# Patient Record
Sex: MALE
[De-identification: names, ages, dates, MRNs, and addresses within clinical notes are randomized per-mention and may not be internally consistent; named-entity substitution may affect disease eponyms.]

---

## 2023-01-03 ENCOUNTER — NON-APPOINTMENT (OUTPATIENT)
Age: 58
End: 2023-01-03

## 2023-01-03 ENCOUNTER — APPOINTMENT (OUTPATIENT)
Dept: OPHTHALMOLOGY | Facility: CLINIC | Age: 58
End: 2023-01-03
Payer: MEDICAID

## 2023-01-03 PROCEDURE — 92250 FUNDUS PHOTOGRAPHY W/I&R: CPT

## 2023-01-03 PROCEDURE — 76512 OPH US DX B-SCAN: CPT | Mod: LT

## 2023-01-03 PROCEDURE — 92004 COMPRE OPH EXAM NEW PT 1/>: CPT

## 2023-11-24 PROBLEM — Z00.00 ENCOUNTER FOR PREVENTIVE HEALTH EXAMINATION: Status: ACTIVE | Noted: 2023-11-24

## 2024-06-07 ENCOUNTER — HOSPITAL ENCOUNTER (EMERGENCY)
Age: 59
Discharge: HOME | End: 2024-06-07
Attending: EMERGENCY MEDICINE
Payer: COMMERCIAL

## 2024-06-07 VITALS
DIASTOLIC BLOOD PRESSURE: 96 MMHG | RESPIRATION RATE: 18 BRPM | OXYGEN SATURATION: 97 % | WEIGHT: 220 LBS | BODY MASS INDEX: 25.98 KG/M2 | HEIGHT: 77 IN | SYSTOLIC BLOOD PRESSURE: 139 MMHG | HEART RATE: 50 BPM | TEMPERATURE: 97 F

## 2024-06-07 DIAGNOSIS — S30.861A TICK BITE OF ABDOMEN, INITIAL ENCOUNTER: Primary | ICD-10-CM
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
## 2024-06-07 PROCEDURE — 99283 EMERGENCY DEPT VISIT LOW MDM: CPT

## 2024-06-07 RX ORDER — DOXYCYCLINE HYCLATE 100 MG/1
200 CAPSULE ORAL ONCE
Status: COMPLETED | OUTPATIENT
Start: 2024-06-07 | End: 2024-06-07

## 2024-06-08 NOTE — ED PROVIDER NOTES
Patient Seen in: ward Emergency Department In [place]      History     Chief Complaint   Patient presents with    Bite Sting,Insect     Stated Complaint: tick bite    Subjective:   HPI    Deer tick on abdomen   Noticed it an hour ago- not sure how long it has been attached    Objective:   History reviewed. No pertinent past medical history.           History reviewed. No pertinent surgical history.             Social History     Socioeconomic History    Marital status:    Tobacco Use    Smoking status: Never    Smokeless tobacco: Never   Vaping Use    Vaping status: Never Used   Substance and Sexual Activity    Alcohol use: Not Currently    Drug use: Never              Review of Systems    Positive for stated complaint: tick bite  Other systems are as noted in HPI.  Constitutional and vital signs reviewed.      All other systems reviewed and negative except as noted above.    Physical Exam     ED Triage Vitals [06/07/24 2136]   BP (!) 143/92   Pulse 56   Resp 16   Temp 96.7 °F (35.9 °C)   Temp src    SpO2 97 %   O2 Device        Current Vitals:   Vital Signs  BP: (!) 143/92  Pulse: 56  Resp: 16  Temp: 96.7 °F (35.9 °C)    Oxygen Therapy  SpO2: 97 %            Physical Exam    Pleasant well developed well-nourished man- Portuguese speaking gentleman lying on ED bed- no acute pain and nontoxic  Trace edema bilateral lower extremities  Lower abdomen with adhered IXODES tick- not engorged  No target lesion        ED Course   Labs Reviewed - No data to display          Tick removed, will give one dose doxycycline to prevent Lyme- stable for discharge home         MDM        tick removed- does not appear engorged but will treat with one dose doxycycline to prevent lyme disease                                  Medical Decision Making      Disposition and Plan     Clinical Impression:  1. Tick bite of abdomen, initial encounter         Disposition:  Discharge  6/7/2024 10:28 pm    Follow-up:  No follow-up  provider specified.        Medications Prescribed:  There are no discharge medications for this patient.

## 2024-06-08 NOTE — DISCHARGE INSTRUCTIONS
Tick bites can transmit illness- lyme disease being the most prevalent in this area- you have been treated for that